# Patient Record
Sex: FEMALE | Race: WHITE | NOT HISPANIC OR LATINO | Employment: OTHER | URBAN - METROPOLITAN AREA
[De-identification: names, ages, dates, MRNs, and addresses within clinical notes are randomized per-mention and may not be internally consistent; named-entity substitution may affect disease eponyms.]

---

## 2019-12-27 RX ORDER — PROMETHAZINE HYDROCHLORIDE 25 MG/1
TABLET ORAL
Qty: 60 TABLET | Refills: 3 | Status: SHIPPED | OUTPATIENT
Start: 2019-12-27

## 2022-02-20 ENCOUNTER — HISTORICAL (OUTPATIENT)
Dept: ADMINISTRATIVE | Facility: HOSPITAL | Age: 72
End: 2022-02-20

## 2022-02-20 ENCOUNTER — HOSPITAL ENCOUNTER (OUTPATIENT)
Dept: MEDSURG UNIT | Facility: HOSPITAL | Age: 72
End: 2022-02-23
Attending: STUDENT IN AN ORGANIZED HEALTH CARE EDUCATION/TRAINING PROGRAM | Admitting: STUDENT IN AN ORGANIZED HEALTH CARE EDUCATION/TRAINING PROGRAM

## 2022-02-20 LAB
ABS NEUT (OLG): 17.01 (ref 2.1–9.2)
ALBUMIN SERPL-MCNC: 3.6 G/DL (ref 3.4–4.8)
ALBUMIN/GLOB SERPL: 1.4 {RATIO} (ref 1.1–2)
ALP SERPL-CCNC: 100 U/L (ref 40–150)
ALT SERPL-CCNC: 9 U/L (ref 0–55)
AMPHET UR QL SCN: NEGATIVE
APPEARANCE, UA: CLEAR
APTT PPP: 42.8 S (ref 23.3–37)
AST SERPL-CCNC: 15 U/L (ref 5–34)
BACTERIA SPEC CULT: NORMAL
BARBITURATE SCN PRESENT UR: NEGATIVE
BASOPHILS # BLD AUTO: 0.1 10*3/UL (ref 0–0.2)
BASOPHILS NFR BLD AUTO: 0 %
BENZODIAZ UR QL SCN: NEGATIVE
BILIRUB SERPL-MCNC: 0.7 MG/DL
BILIRUB UR QL STRIP: NEGATIVE
BILIRUBIN DIRECT+TOT PNL SERPL-MCNC: 0.3 (ref 0–0.5)
BILIRUBIN DIRECT+TOT PNL SERPL-MCNC: 0.4 (ref 0–0.8)
BNP BLD-MCNC: 106.4 PG/ML
BUN SERPL-MCNC: 8.6 MG/DL (ref 9.8–20.1)
CALCIUM SERPL-MCNC: 9.1 MG/DL (ref 8.7–10.5)
CANNABINOIDS UR QL SCN: NEGATIVE
CHLORIDE SERPL-SCNC: 100 MMOL/L (ref 98–107)
CO2 SERPL-SCNC: 27 MMOL/L (ref 23–31)
COCAINE UR QL SCN: NEGATIVE
COLOR UR: NORMAL
CREAT SERPL-MCNC: 0.92 MG/DL (ref 0.55–1.02)
EOSINOPHIL # BLD AUTO: 0 10*3/UL (ref 0–0.9)
EOSINOPHIL NFR BLD AUTO: 0 %
ERYTHROCYTE [DISTWIDTH] IN BLOOD BY AUTOMATED COUNT: 12.6 % (ref 11.5–14.5)
FENTANYL UR QL SCN: NEGATIVE
FLAG2 (OHS): 80
FLAG3 (OHS): 80
FLAGS (OHS): 70
GLOBULIN SER-MCNC: 2.6 G/DL (ref 2.4–3.5)
GLUCOSE (UA): NORMAL
GLUCOSE SERPL-MCNC: 143 MG/DL (ref 82–115)
HCT VFR BLD AUTO: 39.3 % (ref 35–46)
HEMOLYSIS INTERF INDEX SERPL-ACNC: 12
HEMOLYSIS INTERF INDEX SERPL-ACNC: 12
HGB BLD-MCNC: 13 G/DL (ref 12–16)
HGB UR QL STRIP: NEGATIVE
HYALINE CASTS #/AREA URNS LPF: NORMAL /[LPF]
ICTERIC INTERF INDEX SERPL-ACNC: 1
IMM GRANULOCYTES # BLD AUTO: 0.09 10*3/UL
IMM GRANULOCYTES NFR BLD AUTO: 0 %
IMM. NE 1 SUSPECT FLAG (OHS): 30
INR PPP: 1.17 (ref 0.9–1.2)
KETONES UR QL STRIP: NEGATIVE
LEUKOCYTE ESTERASE UR QL STRIP: NEGATIVE
LIPASE SERPL-CCNC: 33 U/L
LIPEMIC INTERF INDEX SERPL-ACNC: <0
LOW EVENT # SUSPECT FLAG (OHS): 70
LYMPHOCYTES # BLD AUTO: 2 10*3/UL (ref 0.6–4.6)
LYMPHOCYTES NFR BLD AUTO: 10 %
MAGNESIUM SERPL-MCNC: 2 MG/DL (ref 1.6–2.6)
MANUAL DIFF? (OHS): NO
MCH RBC QN AUTO: 32.3 PG (ref 26–34)
MCHC RBC AUTO-ENTMCNC: 33.1 G/DL (ref 31–37)
MCV RBC AUTO: 97.5 FL (ref 80–100)
MDMA UR QL SCN: NEGATIVE
MO BLASTS SUSPECT FLAG (OHS): 40
MONOCYTES # BLD AUTO: 1.9 10*3/UL (ref 0.1–1.3)
MONOCYTES NFR BLD AUTO: 9 %
NEUTROPHILS # BLD AUTO: 17.01 10*3/UL (ref 2.1–9.2)
NEUTROPHILS NFR BLD AUTO: 80 %
NITRITE UR QL STRIP: NEGATIVE
NRBC BLD AUTO-RTO: 0 % (ref 0–0.2)
OPIATES UR QL SCN: POSITIVE
PCP UR QL: NEGATIVE
PH UR STRIP.AUTO: 6.5 [PH] (ref 3–11)
PH UR STRIP: 6.5 [PH] (ref 4.5–8)
PHOSPHATE SERPL-MCNC: 2.5 MG/DL (ref 2.3–4.7)
PLATELET # BLD AUTO: 198 10*3/UL (ref 130–400)
PLATELET CLUMPS SUSPECT FLAG (OHS): 40
PMV BLD AUTO: 9.1 FL (ref 7.4–10.4)
POTASSIUM SERPL-SCNC: 3.6 MMOL/L (ref 3.5–5.1)
PROT SERPL-MCNC: 6.2 G/DL (ref 5.8–7.6)
PROT UR QL STRIP: NEGATIVE
PROTHROMBIN TIME: 14.7 S (ref 11.9–14.4)
RBC # BLD AUTO: 4.03 10*6/UL (ref 4–5.2)
RBC #/AREA URNS HPF: NORMAL /[HPF] (ref 0–5)
SODIUM SERPL-SCNC: 135 MMOL/L (ref 136–145)
SP GR UR STRIP: 1.01 (ref 1–1.03)
SQUAMOUS EPITHELIAL, UA: NORMAL
TROPONIN I SERPL-MCNC: 0.03 NG/ML (ref 0–0.04)
TSH SERPL-ACNC: 4.12 M[IU]/L (ref 0.35–4.94)
UROBILINOGEN UR STRIP-ACNC: NORMAL
WBC # SPEC AUTO: 21.2 10*3/UL (ref 4.5–11)
WBC #/AREA URNS HPF: NORMAL /[HPF] (ref 0–5)

## 2022-02-21 LAB
ABS NEUT (OLG): 13.82 (ref 2.1–9.2)
ALBUMIN SERPL-MCNC: 3.3 G/DL (ref 3.4–4.8)
ALBUMIN/GLOB SERPL: 1.2 {RATIO} (ref 1.1–2)
ALP SERPL-CCNC: 86 U/L (ref 40–150)
ALT SERPL-CCNC: 9 U/L (ref 0–55)
AST SERPL-CCNC: 13 U/L (ref 5–34)
BASOPHILS # BLD AUTO: 0.1 10*3/UL (ref 0–0.2)
BASOPHILS NFR BLD AUTO: 0 %
BILIRUB SERPL-MCNC: 0.5 MG/DL
BILIRUBIN DIRECT+TOT PNL SERPL-MCNC: 0.2 (ref 0–0.5)
BILIRUBIN DIRECT+TOT PNL SERPL-MCNC: 0.3 (ref 0–0.8)
BUN SERPL-MCNC: 5.8 MG/DL (ref 9.8–20.1)
CALCIUM SERPL-MCNC: 9 MG/DL (ref 8.7–10.5)
CHLORIDE SERPL-SCNC: 103 MMOL/L (ref 98–107)
CO2 SERPL-SCNC: 28 MMOL/L (ref 23–31)
CREAT SERPL-MCNC: 0.64 MG/DL (ref 0.55–1.02)
EOSINOPHIL # BLD AUTO: 0.1 10*3/UL (ref 0–0.9)
EOSINOPHIL NFR BLD AUTO: 0 %
ERYTHROCYTE [DISTWIDTH] IN BLOOD BY AUTOMATED COUNT: 12.8 % (ref 11.5–14.5)
FLAG2 (OHS): 80
FLAG3 (OHS): 80
FLAGS (OHS): 70
GLOBULIN SER-MCNC: 2.7 G/DL (ref 2.4–3.5)
GLUCOSE SERPL-MCNC: 125 MG/DL (ref 82–115)
HCT VFR BLD AUTO: 34.7 % (ref 35–46)
HEMOLYSIS INTERF INDEX SERPL-ACNC: 1
HGB BLD-MCNC: 11.5 G/DL (ref 12–16)
ICTERIC INTERF INDEX SERPL-ACNC: 0
IMM GRANULOCYTES # BLD AUTO: 0.09 10*3/UL
IMM GRANULOCYTES NFR BLD AUTO: 0 %
IMM. NE 1 SUSPECT FLAG (OHS): 20
LIPEMIC INTERF INDEX SERPL-ACNC: 1
LOW EVENT # SUSPECT FLAG (OHS): 70
LYMPHOCYTES # BLD AUTO: 2.2 10*3/UL (ref 0.6–4.6)
LYMPHOCYTES NFR BLD AUTO: 12 %
MANUAL DIFF? (OHS): NO
MCH RBC QN AUTO: 32.9 PG (ref 26–34)
MCHC RBC AUTO-ENTMCNC: 33.1 G/DL (ref 31–37)
MCV RBC AUTO: 99.1 FL (ref 80–100)
MO BLASTS SUSPECT FLAG (OHS): 40
MONOCYTES # BLD AUTO: 1.3 10*3/UL (ref 0.1–1.3)
MONOCYTES NFR BLD AUTO: 8 %
NEUTROPHILS # BLD AUTO: 13.82 10*3/UL (ref 2.1–9.2)
NEUTROPHILS NFR BLD AUTO: 79 %
NRBC BLD AUTO-RTO: 0 % (ref 0–0.2)
PLATELET # BLD AUTO: 190 10*3/UL (ref 130–400)
PLATELET CLUMPS SUSPECT FLAG (OHS): 10
PMV BLD AUTO: 9.4 FL (ref 7.4–10.4)
POTASSIUM SERPL-SCNC: 3.4 MMOL/L (ref 3.5–5.1)
PROT SERPL-MCNC: 6 G/DL (ref 5.8–7.6)
RBC # BLD AUTO: 3.5 10*6/UL (ref 4–5.2)
SODIUM SERPL-SCNC: 136 MMOL/L (ref 136–145)
WBC # SPEC AUTO: 17.6 10*3/UL (ref 4.5–11)

## 2022-02-22 LAB
ABS NEUT (OLG): 10.26 (ref 2.1–9.2)
ALBUMIN SERPL-MCNC: 2.9 G/DL (ref 3.4–4.8)
ALBUMIN/GLOB SERPL: 1 {RATIO} (ref 1.1–2)
ALP SERPL-CCNC: 92 U/L (ref 40–150)
ALT SERPL-CCNC: 9 U/L (ref 0–55)
AST SERPL-CCNC: 12 U/L (ref 5–34)
BASOPHILS # BLD AUTO: 0 10*3/UL (ref 0–0.2)
BASOPHILS NFR BLD AUTO: 0 %
BILIRUB SERPL-MCNC: 0.4 MG/DL
BILIRUBIN DIRECT+TOT PNL SERPL-MCNC: 0.2 (ref 0–0.5)
BILIRUBIN DIRECT+TOT PNL SERPL-MCNC: 0.2 (ref 0–0.8)
BUN SERPL-MCNC: 4.1 MG/DL (ref 9.8–20.1)
CALCIUM SERPL-MCNC: 8.6 MG/DL (ref 8.7–10.5)
CHLORIDE SERPL-SCNC: 103 MMOL/L (ref 98–107)
CO2 SERPL-SCNC: 26 MMOL/L (ref 23–31)
CREAT SERPL-MCNC: 0.57 MG/DL (ref 0.55–1.02)
EOSINOPHIL # BLD AUTO: 0.1 10*3/UL (ref 0–0.9)
EOSINOPHIL NFR BLD AUTO: 1 %
ERYTHROCYTE [DISTWIDTH] IN BLOOD BY AUTOMATED COUNT: 12.5 % (ref 11.5–14.5)
FLAG2 (OHS): 80
FLAG3 (OHS): 80
FLAGS (OHS): 70
GLOBULIN SER-MCNC: 2.8 G/DL (ref 2.4–3.5)
GLUCOSE SERPL-MCNC: 118 MG/DL (ref 82–115)
HCT VFR BLD AUTO: 34.5 % (ref 35–46)
HEMOLYSIS INTERF INDEX SERPL-ACNC: 5
HEMOLYSIS INTERF INDEX SERPL-ACNC: 9
HGB BLD-MCNC: 11.3 G/DL (ref 12–16)
ICTERIC INTERF INDEX SERPL-ACNC: 0
IMM GRANULOCYTES # BLD AUTO: 0.04 10*3/UL
IMM GRANULOCYTES NFR BLD AUTO: 0 %
IMM. NE 1 SUSPECT FLAG (OHS): 30
LIPEMIC INTERF INDEX SERPL-ACNC: 1
LOW EVENT # SUSPECT FLAG (OHS): 70
LYMPHOCYTES # BLD AUTO: 1.2 10*3/UL (ref 0.6–4.6)
LYMPHOCYTES NFR BLD AUTO: 10 %
MAGNESIUM SERPL-MCNC: 1.9 MG/DL (ref 1.6–2.6)
MANUAL DIFF? (OHS): NO
MCH RBC QN AUTO: 32.6 PG (ref 26–34)
MCHC RBC AUTO-ENTMCNC: 32.8 G/DL (ref 31–37)
MCV RBC AUTO: 99.4 FL (ref 80–100)
MO BLASTS SUSPECT FLAG (OHS): 40
MONOCYTES # BLD AUTO: 0.8 10*3/UL (ref 0.1–1.3)
MONOCYTES NFR BLD AUTO: 7 %
NEUTROPHILS # BLD AUTO: 10.26 10*3/UL (ref 2.1–9.2)
NEUTROPHILS NFR BLD AUTO: 82 %
NRBC BLD AUTO-RTO: 0 % (ref 0–0.2)
PHOSPHATE SERPL-MCNC: 3.6 MG/DL (ref 2.3–4.7)
PLATELET # BLD AUTO: 162 10*3/UL (ref 130–400)
PLATELET CLUMPS SUSPECT FLAG (OHS): 10
PMV BLD AUTO: 9.4 FL (ref 7.4–10.4)
POTASSIUM SERPL-SCNC: 3.5 MMOL/L (ref 3.5–5.1)
PROT SERPL-MCNC: 5.7 G/DL (ref 5.8–7.6)
RBC # BLD AUTO: 3.47 10*6/UL (ref 4–5.2)
SODIUM SERPL-SCNC: 139 MMOL/L (ref 136–145)
WBC # SPEC AUTO: 12.5 10*3/UL (ref 4.5–11)

## 2022-02-23 LAB
ABS NEUT (OLG): 5.49 (ref 2.1–9.2)
ALBUMIN SERPL-MCNC: 3 G/DL (ref 3.4–4.8)
ALBUMIN/GLOB SERPL: 1 {RATIO} (ref 1.1–2)
ALP SERPL-CCNC: 82 U/L (ref 40–150)
ALT SERPL-CCNC: 8 U/L (ref 0–55)
AST SERPL-CCNC: 13 U/L (ref 5–34)
BASOPHILS # BLD AUTO: 0 10*3/UL (ref 0–0.2)
BASOPHILS NFR BLD AUTO: 0 %
BILIRUB SERPL-MCNC: 0.4 MG/DL
BILIRUBIN DIRECT+TOT PNL SERPL-MCNC: 0.2 (ref 0–0.5)
BILIRUBIN DIRECT+TOT PNL SERPL-MCNC: 0.2 (ref 0–0.8)
BUN SERPL-MCNC: 3.9 MG/DL (ref 9.8–20.1)
CALCIUM SERPL-MCNC: 8.7 MG/DL (ref 8.7–10.5)
CHLORIDE SERPL-SCNC: 102 MMOL/L (ref 98–107)
CO2 SERPL-SCNC: 26 MMOL/L (ref 23–31)
CREAT SERPL-MCNC: 0.59 MG/DL (ref 0.55–1.02)
EOSINOPHIL # BLD AUTO: 0.1 10*3/UL (ref 0–0.9)
EOSINOPHIL NFR BLD AUTO: 1 %
ERYTHROCYTE [DISTWIDTH] IN BLOOD BY AUTOMATED COUNT: 12.3 % (ref 11.5–14.5)
FLAG2 (OHS): 80
FLAG3 (OHS): 80
FLAGS (OHS): 70
GLOBULIN SER-MCNC: 2.9 G/DL (ref 2.4–3.5)
GLUCOSE SERPL-MCNC: 113 MG/DL (ref 82–115)
HCT VFR BLD AUTO: 35.9 % (ref 35–46)
HEMOLYSIS INTERF INDEX SERPL-ACNC: 8
HEMOLYSIS INTERF INDEX SERPL-ACNC: 9
HGB BLD-MCNC: 11.8 G/DL (ref 12–16)
ICTERIC INTERF INDEX SERPL-ACNC: 0
IMM GRANULOCYTES # BLD AUTO: 0.03 10*3/UL
IMM GRANULOCYTES NFR BLD AUTO: 0 %
IMM. NE 1 SUSPECT FLAG (OHS): 10
IMM. NE 2 SUSPECT FLAG (OHS): 20
LIPEMIC INTERF INDEX SERPL-ACNC: <0
LOW EVENT # SUSPECT FLAG (OHS): 70
LYMPHOCYTES # BLD AUTO: 1.2 10*3/UL (ref 0.6–4.6)
LYMPHOCYTES NFR BLD AUTO: 16 %
MAGNESIUM SERPL-MCNC: 2.1 MG/DL (ref 1.6–2.6)
MANUAL DIFF? (OHS): NO
MCH RBC QN AUTO: 32.7 PG (ref 26–34)
MCHC RBC AUTO-ENTMCNC: 32.9 G/DL (ref 31–37)
MCV RBC AUTO: 99.4 FL (ref 80–100)
MO BLASTS SUSPECT FLAG (OHS): 40
MONOCYTES # BLD AUTO: 0.7 10*3/UL (ref 0.1–1.3)
MONOCYTES NFR BLD AUTO: 10 %
NEUTROPHILS # BLD AUTO: 5.49 10*3/UL (ref 2.1–9.2)
NEUTROPHILS NFR BLD AUTO: 72 %
NRBC BLD AUTO-RTO: 0 % (ref 0–0.2)
PHOSPHATE SERPL-MCNC: 3.5 MG/DL (ref 2.3–4.7)
PLATELET # BLD AUTO: 179 10*3/UL (ref 130–400)
PMV BLD AUTO: 9.4 FL (ref 7.4–10.4)
POTASSIUM SERPL-SCNC: 3 MMOL/L (ref 3.5–5.1)
PROT SERPL-MCNC: 5.9 G/DL (ref 5.8–7.6)
RBC # BLD AUTO: 3.61 10*6/UL (ref 4–5.2)
SODIUM SERPL-SCNC: 138 MMOL/L (ref 136–145)
WBC # SPEC AUTO: 7.6 10*3/UL (ref 4.5–11)

## 2022-05-14 NOTE — DISCHARGE SUMMARY
Admit and Discharge Dates  Admit Date: 02/20/2022  Discharge Date: 02/23/2022  Physicians  Attending Physician - Reji Gillis DO  Admitting Physician - Reji Gillis DO  Primary Care Physician - No PCP, No  Discharge Diagnosis  Abdominal pain?[id number]  Blood in stool?[id number]  Surgical Procedures  No procedures recorded for this visit.  Immunizations  No immunizations recorded for this visit.  Admission Information  ?71-year-old female past medical history for psoriatic arthritis presents to [facility] ED displayed from [facility], staying in [place] for the last 6 months in a motel. ?Patient stated that she has been constipated for couple weeks and decided to take MiraLAX, Metamucil and another laxative she missed in a glass of water on 2/18/2022. ?Later that night she began having bowel movements which initially started off as formed and then became liquid. ?The next day 2/19/2022 patient stated that she noticed blood in her stool but stated that it was covered from her urethra and the bed was covered in blood. ?Patient began feeling lightheaded on 2/20/2022 and EMS was called and when they found patient and she was hypotensive per ED physician. ?Patient endorses fevers (unconfirmed), fatigue and shortness of breath when she is around her family. ?Patient ambulates with a walker for the past 12 years due to her back pain. ?Patient has never had a colonoscopy. ?CT abdomen with contrast revealed distal colitis. Patient received a liter bolus of LR in ED  ?  Hospital Course  Patient was continued on her treatment of cipro and flagyll. Patients H/h was trended and was stable. GI was consulted, who have recommended that patient be referred outpatient for colonoscopy. Patient is medically and hemodynamically stable for discharge. Patient will fu w/ post wards and have a colonoscopy via referral to?GI at [facility]  Time Spent on discharge  >30 mins  Objective  Vitals &  Measurements  T:?37.4? ?C (Oral)? TMIN:?37.1? ?C (Oral)? TMAX:?37.4? ?C (Oral)? HR:?82(Peripheral)? RR:?19? BP:?148/83? SpO2:?92%?  Physical Exam  Eye: PERRLA, EOMI, clear conjunctiva, eyelids normal  HENT:?external ears?clear, oropharynx without erythema/exudate, oropharynx and nasal mucosal surfaces moist, no maxillary/frontal sinus tenderness to palpation  Neck: full range of motion, no thyromegaly or lymphadenopathy  Respiratory:?clear to auscultation bilaterally  Cardiovascular:?regular rate and rhythm without murmurs, gallops or rubs  Gastrointestinal:?soft, non-tender, non-distended with normal bowel sounds, without masses to palpation  Genitourinary: no CVA tenderness to palpation  Musculoskeletal:?decreased ROM of back  Integumentary: 2 oval-shaped bruises 1-2inches in diameter on LUQ  Neurologic: PERRL, EOMI, facial sensation and motor function intact, palate rises symmetrically, tongue protrusion midline, shoulder shrug intact, no signs of peripheral neurological deficit  Patient Discharge Condition  Medically and Hemodynamically stable  Discharge Disposition  Earlene Roy?is being discharged?home.  ?   Activity:?Return to normal activity.  Diet:?DASH Diet  ?  Medications:  Cipro Flagyl for 5 more days  Continue medications as previously prescribed  ?  ?   Follow Ups:  [facility] GI for colonoscopy  Post wards in 2 weeks  ?  ?   The above information was discussed with?the patient?in clear terms, who was able to repeat the instructions to me. All questions answered. ED precautions provided.?   Discharge Medication Reconciliation  Prescribed  Misc Prescription (Chay Walker)?See Instructions  atorvastatin (atorvastatin 80 mg oral tablet)?80 mg, Oral, Daily  benazepril (benazepril 20 mg oral tablet)?20 mg, Oral, Daily  ciprofloxacin (Cipro 500 mg oral tablet)?500 mg, Oral, BID  levothyroxine (levothyroxine 100 mcg (0.1 mg) oral tablet)?100 mcg, Oral, Daily  metoprolol (metoprolol tartrate 25 mg oral tab)?25 mg,  Oral, BID  metroNIDAZOLE (Flagyl 500 mg oral tablet)?500 mg, Oral, TID  polyethylene glycol 3350 (MiraLax oral powder for reconstitution)?17 gm, Oral, Daily  psyllium (psyllium 3.4 g/5.2 g oral powder for reconstitution)?1.7 gm, Oral, TID, PRN as needed for constipation  Continue  clonazePAM (clonazePAM 2 mg oral tablet)?2 mg, Oral, BID  clopidogrel (clopidogrel 75 mg oral tablet)?75 mg, Oral, Daily  gabapentin (Neurontin 300 mg oral capsule)?300 mg, Oral, TID  methocarbamol (methocarbamol 750 mg oral tablet)?1500 mg, Oral, TID  mirtazapine (mirtazapine 15 mg oral tablet)?15 mg, Oral, Once a day (at bedtime)  modafinil (modafinil 200 mg oral tablet)?200 mg, Oral, qAM  naproxen (naproxen 500 mg oral tablet)?500 mg, Oral, BID  venlafaxine (venlafaxine 150 mg oral capsule, extended release)?150 mg, Oral, Daily  Discontinue  acetaminophen-oxyCODONE (acetaminophen-oxycodone 325 mg-10 mg oral tablet)?1 tab(s), Oral, q6hr  azithromycin (azithromycin 250 mg oral tablet), Oral  cyclobenzaprine (cyclobenzaprine 10 mg oral tablet)?10 mg, Oral, TID  methylPREDNISolone (methylPREDNISolone 4 mg oral tab), Oral, As Directed  morphine (morphine 15 mg/8 to 12 hr oral tablet, extended release)?15 mg, Oral, BID  Education and Orders Provided  Constipation, Adult  Discharge - 02/23/22 11:37:00 CST, Home, discharge after meds to bed?  Follow up  [facility], within 1 month  ????Post wards  [employer]  No Qualifying Data

## 2022-05-14 NOTE — ED PROVIDER NOTES
Patient:   Earlene Roy            MRN: [id number]            FIN: [id number]               Age:   71 years     Sex:  Female     :  1950   Associated Diagnoses:   Acute colitis; Abdominal pain, acute   Author:   Juan Antonio Mcclelland DO      Basic Information   Time seen: Date & time 2022 08:50:00.   History source: Patient, EMS.   Arrival mode: Ambulance.   History limitation: None.   Additional information: Chief Complaint from Nursing Triage Note : Chief Complaint   2022 8:41 CST       Chief Complaint           Pt arrived via ems. Per ems, pt was constipated for 2 weeks. She took laxitive yesterday, had a bowel movement, and had karyn bright red blood in her stool. Also complaining of abdominal pain.  Pt hypotensive and tachycardic per ems  .   Provider/Visit info:   Time Seen:  Juan Antonio Mcclelland DO / 2022 08:38  .   History of Present Illness   71-year-old female presents to ED via EMS for rectal bleeding and abdominal discomfort.  Patient states she is never had this before.  States she did not have a bowel movement for greater than 1 week and took multiple laxatives.  States yesterday she had a large bowel movement.  Afterwards noticed intermittent lower abdominal cramping and discomfort.  States with this she had a movement of bowel movements and with some associated blood.  She states she felt weak so she called EMS.  on anticoagulation secondary to previous cardiac stents.  Compliant.  EMS states on arrival the patient appeared cool clammy and pale.  reported initially tachycardic in the 130s and hypotensive with systolic in the 80s.  They gave 500 of fluids and vitals grossly normalized besides mild residual tachycardia.  no other medications given, no other complaints at this time.      Review of Systems   Constitutional symptoms:  Weakness, fatigue, no fever, no chills.    Skin symptoms:  No rash,    Eye symptoms:  Vision unchanged.   ENMT symptoms:  No ear pain, no sore throat.     Respiratory symptoms:  No shortness of breath, no cough   Cardiovascular symptoms:  No chest pain, no syncope.    Gastrointestinal symptoms:  Abdominal pain, diarrhea, constipation, rectal bleeding, no nausea, no vomiting, no rectal pain.    Genitourinary symptoms:  No dysuria, no hematuria.    Musculoskeletal symptoms:  No back pain,    Neurologic symptoms:  No headache, no dizziness.    Psychiatric symptoms:               Additional review of systems information: All other systems reviewed and otherwise negative.      Health Status   Allergies:    Allergic Reactions (Selected)  Severity Not Documented  Demerol- Unknown.,    Allergies (1) Active Reaction  Demerol Unknown  .   Medications:  (Selected)   Prescriptions  Prescribed  Neurontin 300 mg oral capsule: 300 mg = 1 cap(s), Oral, TID, begin taking at night, # 90 cap(s), 0 Refill(s)  Documented Medications  Documented  acetaminophen-oxycodone 325 mg-10 mg oral tablet: 1 tab(s), Oral, q6hr  atorvastatin 80 mg oral tablet: 80 mg = 1 tab(s), Oral, Daily  azithromycin 250 mg oral tablet: Oral  benazepril 20 mg oral tablet: 20 mg = 1 tab(s), Oral, Daily  clonazePAM 2 mg oral tablet: 2 mg = 1 tab(s), Oral, BID  clopidogrel 75 mg oral tablet: 75 mg = 1 tab(s), Oral, Daily  cyclobenzaprine 10 mg oral tablet: 10 mg = 1 tab(s), Oral, TID  levothyroxine 100 mcg (0.1 mg) oral tablet: 100 mcg = 1 tab(s), Oral, Daily  methocarbamol 750 mg oral tablet: 1500 mg = 2 tab(s), Oral, TID  methylPREDNISolone 4 mg oral tab: Oral, As Directed  metoprolol tartrate 25 mg oral tab: 25 mg = 1 tab(s), Oral, BID  mirtazapine 15 mg oral tablet: 15 mg = 1 tab(s), Oral, Once a day (at bedtime)  modafinil 200 mg oral tablet: 200 mg = 1 tab(s), Oral, qAM  morphine 15 mg/8 to 12 hr oral tablet, extended release: 15 mg = 1 tab(s), Oral, BID  naproxen 500 mg oral tablet: 500 mg = 1 tab(s), Oral, BID  venlafaxine 150 mg oral capsule, extended release: 150 mg = 1 cap(s), Oral, Daily.      Past  Medical/ Family/ Social History   Medical history:    No active or resolved past medical history items have been selected or recorded..   Surgical history:    No active procedure history items have been selected or recorded..   Family history:    No family history items have been selected or recorded..   Social history:    Social & Psychosocial Habits    Alcohol  02/20/2022  Use: Never    Tobacco  11/29/2021  Use: Former smoker, quit more    Patient Wants Consult For Cessation Counseling N/A    02/20/2022  Use: Never (less than 100 in l    Patient Wants Consult For Cessation Counseling No    Abuse/Neglect  11/29/2021  SHX Any signs of abuse or neglect No    02/20/2022  SHX Any signs of abuse or neglect No    02/20/2022  SHX Any signs of abuse or neglect No  .   Problem list:    No qualifying data available  .      Physical Examination               Vital Signs   Vital Signs   2/20/2022 8:41 CST       Peripheral Pulse Rate     114 bpm  HI                             Respiratory Rate          14 br/min                             SpO2                      95 %                             Oxygen Therapy            Room air                             Systolic Blood Pressure   131 mmHg                             Diastolic Blood Pressure  75 mmHg  .      Vital Signs (last 24 hrs)_____  Last Charted___________  Heart Rate Peripheral   H 114bpm  (FEB 20 08:41)  Resp Rate         14 br/min  (FEB 20 08:41)  SBP      131 mmHg  (FEB 20 08:41)  DBP      75 mmHg  (FEB 20 08:41)  SpO2      95 %  (FEB 20 08:41)  Height      168 cm  (FEB 20 08:41)  .   Measurements   2/20/2022 8:41 CST       Weight Dosing             86 kg                             Weight Measured and Calculated in Lbs     189.60 lb                             Weight Estimated          86 kg                             Height/Length Dosing      168 cm                             Height/Length Measured    168 cm  .   Basic Oxygen Information   2/20/2022 8:41 CST        SpO2                      95 %                             Oxygen Therapy            Room air  .   General:  Alert, no acute distress, Patient appears in no acute distress.  Bedside vitals are stable on arrival besides initial mild sinus tachycardia.  Not hypotensive pale or clammy.  Physical exam is grossly unremarkable for acute except for moderate bilateral lower abdominal pain to palpation.  No rebound guarding rigidity. no other acute findings..    [place] coma scale:  Total score: Total score: 15.   Neurological:  Alert and oriented to person, place, time, and situation, normal motor observed, normal speech observed   Skin:  Warm, dry.    Head:  Normocephalic, atraumatic.    Neck:  Supple, trachea midline, no tenderness.    Eye:  Pupils are equal, round and reactive to light, extraocular movements are intact, normal conjunctiva, vision grossly normal.    Cardiovascular:  No murmur, Tachycardia.    Respiratory:  Lungs are clear to auscultation, respirations are non-labored, breath sounds are equal, Symmetrical chest wall expansion.    Chest wall:  No tenderness.   Musculoskeletal:  Normal ROM, normal strength, no swelling, no deformity.    Gastrointestinal:  Soft, Non distended, Tenderness: Moderate, right lower quadrant, left lower quadrant, Guarding: Negative, Rebound: Negative, Bowel sounds: Normal, Trauma: Negative, Signs: McBurney's negative, Arevalo's negative, Rovsing's negative.    Psychiatric:  Cooperative, appropriate mood & affect, normal judgment.       Medical Decision Making   Differential Diagnosis:  Abdominal pain, Appendicitis, bowel obstruction, bowel perforation, pancreatitis, irritable bowel syndrome, urinary tract infection, gastroesophageal reflux disease, gastroenteritis, peptic ulcer disease, gastritis, ischemic bowel, diverticulitis, constipation, incarcerated hernia.    Rationale:  Elderly female presents for worsening bilateral lower quadrant abdominal pain.  EMS were concerned  since on arrival she was tachycardic and hypotensive.  Blood pressure resolved with fluids however tachycardia remained.  She was tender to palpation without rebound guarding or rigidity.  Labs returned showing elevated white count of 21k.  Otherwise electrolytes and viral panel were unremarkable.  CT did show evidence of colitis w/o perf or abscess.  Secondary to these findings fluids, pain medicine and IV antibiotics were initiated.  Patient was admitted to internal medicine and care was formally transitioned.  pt informed of all findings and remained stable under my care..   Documents reviewed:  Emergency department nurses' notes, emergency department records, prior records.    Cardiac monitor:  Sinus Tachycardia.   Electrocardiogram:  No ectopy, The Rhythm is sinus tachycardia.  , The Axis is leftward.  , P wave and NV interval WNL, QT interval Prolonged 511 seconds, QRS interval WNL.    Results review:     No qualifying data available, All Results   2/20/2022 8:57 CST       WBC                       21.2 x10(3)/mcL  HI                             RBC                       4.03 x10(6)/mcL                             Hgb                       13.0 gm/dL                             Hct                       39.3 %                             Platelet                  198 x10(3)/mcL                             MCV                       97.5 fL                             MCH                       32.3 pg                             MCHC                      33.1 gm/dL                             RDW                       12.6 %                             MPV                       9.1 fL                             Abs Neut                  17.01 x10(3)/mcL  HI                             Neutro Auto               80 %  NA                             Lymph Auto                10 %  NA                             Mono Auto                 9 %  NA                             Eos Auto                  0 %  NA                              Abs Eos                   0.0 x10(3)/mcL                             Basophil Auto             0 %  NA                             Abs Neutro                17.01 x10(3)/mcL  HI                             Abs Lymph                 2.0 x10(3)/mcL                             Abs Mono                  1.9 x10(3)/mcL  HI                             Abs Baso                  0.1 x10(3)/mcL                             NRBC%                     0.0 %                             IG%                       0 %  NA                             IG#                       0.090  NA                             Sodium Lvl                135 mmol/L  LOW                             Potassium Lvl             3.6 mmol/L                             Chloride                  100 mmol/L                             CO2                       27 mmol/L                             Calcium Lvl               9.1 mg/dL                             Magnesium Lvl             2.00 mg/dL                             Glucose Lvl               143 mg/dL  HI                             BUN                       8.6 mg/dL  LOW                             Creatinine                0.92 mg/dL                             eGFR-AA                   78  LOW                             eGFR-TANAY                  64 mL/min/1.73 m2  LOW                             Bili Total                0.7 mg/dL                             Bili Direct               0.3 mg/dL                             Bili Indirect             0.40 mg/dL                             AST                       15 unit/L                             ALT                       9 unit/L                             Alk Phos                  100 unit/L                             Total Protein             6.2 gm/dL                             Albumin Lvl               3.6 gm/dL                             Globulin                  2.6 gm/dL                             A/G Ratio                  1.4 ratio                             Phosphorus                2.5 mg/dL                             BNP                       106.4 pg/mL  HI                             Lipase Lvl                33 U/L                             Troponin-I                0.033 ng/mL                             TSH                       4.1244 uIU/mL                             Influ A PCR               Negative                             Influ B PCR               Negative                             SARS-CoV-2 PCR            Not Detected                             ABORh Auto Intp           O POS                             ABSC Auto Intrp           Neg  .    Radiology results:  Rad Results (ST)  < 12 hrs     * Final Report *    Reason For Exam  Abdominal Pain    Radiology Report  CT ABDOMEN and PELVIS WITHOUT and WITH intravenous contrast  enhancement and 2-D reformations     HISTORY: Abdominal Pain    DLP 5440 MG Y CM      As per PQRS measures, all CT scans at this facility used dose  modulation, iterative reconstruction, and/or weight based dose  adjustment when appropriate to reduce radiation dose to as low as  reasonably achievable.     FINDINGS:   view remarkable for severe bilateral hip arthritic change     The distal colon is thick-walled with mucosal hyperemia and mild  surrounding stranding. No overt active extravasation of contrast  identified. No fluid collection or perforation identified. No  obstructive bowel findings. No pericecal inflammatory change.     Liver and spleen normal size. Gallbladder mildly distended. Pancreas  partially fatty atrophic. Adrenal glands and kidneys unremarkable.     Abdominal aorta mildly ectatic with moderate atherosclerotic change in  the aorta and branches. Urinary bladder moderately distended.  Severe lower lumbar spondylosis        IMPRESSION:  Distal colitis suspected uncomplicated.      Reexamination/ Reevaluation   Vital signs   Basic Oxygen Information    2/20/2022 8:41 CST       SpO2                      95 %                             Oxygen Therapy            Room air        Impression and Plan   Diagnosis   Acute colitis (HXG12-MC K52.9)   Abdominal pain, acute ([id number] R10.9)      Calls-Consults   -  IM.   Plan   Disposition: Admit to Inpatient Unit.    Counseled: Patient, Regarding diagnosis, Regarding diagnostic results, Regarding treatment plan, Regarding prescription, Patient indicated understanding of instructions.

## 2022-05-20 NOTE — HISTORICAL [facility]ERNER
This is a historical note converted from [employer]. Formatting and pictures may have been removed.  Please reference [employer] for original formatting and attached multimedia. Chief Complaint  Pt arrived via ems. Per ems, pt was constipated for 2 weeks. She took laxitive yesterday, had a bowel movement, and had karyn bright red blood in her stool. Also complaining of abdominal pain. ?Pt hypotensive and tachycardic per ems  Reason for Consultation  BRBPR  History of Present Illness  72 y/o WF, pMHx HTN, HLD, CAD, MI, hypothyroidism, psoriatic arthritis,?chronic pain presented to the ED on 2/20/2022 w/ abd pain and BRBPR. Patient reported constipation x 2 weeks. She took MiraLAX,?Metamucil,?and bisacodyl on 2/18/2022 and subsequently had a large bright red bowel movement. GI has been consulted for BRBPR  ?  Today, patient is incoherent. She is only oriented to name and answers all other questions inappropriately.  Review of Systems  Unable to obtain 2/2 AMS  Physical Exam  Vitals & Measurements  T:?37.0? ?C (Oral)? TMIN:?36.7? ?C (Oral)? TMAX:?37.4? ?C (Oral)? HR:?98(Monitored)? HR:?96(Peripheral)? RR:?16? BP:?122/77? SpO2:?95%? WT:?86?kg? BMI:?30.47?  General:?well-developed well-nourished in no acute distress  Eye: PERRLA, EOMI, clear conjunctiva  HENT:? oropharynx without erythema/exudate, oropharynx and nasal mucosal surfaces moist  Neck:? no thyromegaly or lymphadenopathy, trachea midline  Respiratory:?symmetrical chest expansion and respiratory effort, clear to auscultation bilaterally  Cardiovascular:?regular rate and rhythm without murmurs, gallops or rubs  Gastrointestinal:?soft, non-tender, non-distended, normoactive bowel sounds?without masses to palpation  Integumentary: no rashes or skin lesions present  Neurologic: cranial nerves intact, lethargic, arousable to verbal stimuli, oriented only to self  Psych: unable to assess  ?  Labs Last 24 Hours?  ?Chemistry? Hematology/Coagulation?   Sodium Lvl: 136 mmol/L  (02/21/22 04:30:00) WBC:?17.6 x10(3)/mcL?High (02/21/22 04:30:00)   Potassium Lvl:?3.4 mmol/L?Low (02/21/22 04:30:00) RBC:?3.5 x10(6)/mcL?Low (02/21/22 04:30:00)   Chloride: 103 mmol/L (02/21/22 04:30:00) Hgb:?11.5 gm/dL?Low (02/21/22 04:30:00)   CO2: 28 mmol/L (02/21/22 04:30:00) Hct:?34.7 %?Low (02/21/22 04:30:00)   Calcium Lvl: 9 mg/dL (02/21/22 04:30:00) Platelet: 190 x10(3)/mcL (02/21/22 04:30:00)   Glucose Lvl:?125 mg/dL?High (02/21/22 04:30:00) MCV: 99.1 fL (02/21/22 04:30:00)   BUN:?5.8 mg/dL?Low (02/21/22 04:30:00) MCH: 32.9 pg (02/21/22 04:30:00)   Creatinine: 0.64 mg/dL (02/21/22 04:30:00) MCHC: 33.1 gm/dL (02/21/22 04:30:00)   Est Creat Clearance Ser: 76.99 mL/min (02/21/22 05:21:21) RDW: 12.8 % (02/21/22 04:30:00)   eGFR-AA: >105 (02/21/22 04:30:00) MPV: 9.4 fL (02/21/22 04:30:00)   eGFR-TANYA: 98 mL/min/1.73 m2 (02/21/22 04:30:00) Abs Neut:?13.82 x10(3)/mcL?High (02/21/22 04:30:00)   Bili Total: 0.5 mg/dL (02/21/22 04:30:00) Neutro Auto: 79 % (02/21/22 04:30:00)   Bili Direct: 0.2 mg/dL (02/21/22 04:30:00) Lymph Auto: 12 % (02/21/22 04:30:00)   Bili Indirect: 0.3 mg/dL (02/21/22 04:30:00) Mono Auto: 8 % (02/21/22 04:30:00)   AST: 13 unit/L (02/21/22 04:30:00) Eos Auto: 0 % (02/21/22 04:30:00)   ALT: 9 unit/L (02/21/22 04:30:00) Abs Eos: 0.1 x10(3)/mcL (02/21/22 04:30:00)   Alk Phos: 86 unit/L (02/21/22 04:30:00) Basophil Auto: 0 % (02/21/22 04:30:00)   Total Protein: 6 gm/dL (02/21/22 04:30:00) Abs Neutro:?13.82 x10(3)/mcL?High (02/21/22 04:30:00)   Albumin Lvl:?3.3 gm/dL?Low (02/21/22 04:30:00) Abs Lymph: 2.2 x10(3)/mcL (02/21/22 04:30:00)   Globulin: 2.7 gm/dL (02/21/22 04:30:00) Abs Mono: 1.3 x10(3)/mcL (02/21/22 04:30:00)   A/G Ratio: 1.2 ratio (02/21/22 04:30:00) Abs Baso: 0.1 x10(3)/mcL (02/21/22 04:30:00)    NRBC%: 0.0 (02/21/22 04:30:00)    IG%: 0 % (02/21/22 04:30:00)    IG#: 0.09 (02/21/22 04:30:00)    PT:?14.7 second(s)?High (02/20/22 13:23:00)    INR: 1.17 (02/20/22 13:23:00)    PTT:?42.8  second(s)?High (02/20/22 13:23:00)   ?  Accession:?[id number]  Order:?CT Abdomen and Pelvis W W/O Contrast  Report Dt/Tm:?02/20/2022 11:19  Report:?  CT ABDOMEN and PELVIS WITHOUT and WITH intravenous contrast  enhancement and 2-D reformations  ?  HISTORY: Abdominal Pain ?  DLP 5440 MG Y CM?  ?  As per PQRS measures, all CT scans at this facility used dose  modulation, iterative reconstruction, and/or weight based dose  adjustment when appropriate to reduce radiation dose to as low as  reasonably achievable.  ?  FINDINGS:   view remarkable for severe bilateral hip arthritic change  ?  The distal colon is thick-walled with mucosal hyperemia and mild  surrounding stranding. No overt active extravasation of contrast  identified. No fluid collection or perforation identified. No  obstructive bowel findings. No pericecal inflammatory change.  ?  Liver and spleen normal size. Gallbladder mildly distended. Pancreas  partially fatty atrophic. Adrenal glands and kidneys unremarkable.  ?  Abdominal aorta mildly ectatic with moderate atherosclerotic change in  the aorta and branches. Urinary bladder moderately distended.  Severe lower lumbar spondylosis  ?  ?  IMPRESSION:  Distal colitis suspected uncomplicated?  Assessment/Plan  1. ?Hematochezia  ?-Hgb stable  ?-Will reevaluate tomorrow  ?-If Hgb remains stable, will pursue?outpatient colonoscopy   Problem List/Past Medical History  Ongoing  Obesity  Historical  No qualifying data  Medications  Inpatient  atorvastatin 20 mg oral tablet, 80 mg= 4 tab(s), Oral, Daily  benazepril 20 mg oral tablet, 20 mg, Oral, Daily  Cipro, 400 mg= 200 mL, IV Piggyback, q12hr  clonazePAM, 2 mg= 2 tab(s), Oral, BID  clopidogrel 75 mg oral tablet, 75 mg= 1 tab(s), Oral, Daily  Effexor  mg oral capsule, extended release, 150 mg= 1 cap(s), Oral, Daily  Flagyl 500 mg / 100 ml premix, 500 mg= 100 mL, IV Piggyback, q8hr  KCL 10 mEq Uizel - Peripheral Line, 10 mEq= 50 mL, IV Piggyback,  q1hr  Lactated Ringers 1000ml 1,000 mL, 1000 mL, IV  levothyroxine 100 mcg (0.1 mg) oral tablet, 100 mcg= 1 tab(s), Oral, Daily  Lovenox, 40 mg= 0.4 mL, Subcutaneous, Daily  metoprolol tartrate 25 mg oral tab, 25 mg= 1 tab(s), Oral, BID  mirtazapine, 15 mg= 0.5 tab(s), Oral, Once a day (at bedtime)  Protonix, 40 mg= 1 EA, IV Slow, Daily  Home  acetaminophen-oxycodone 325 mg-10 mg oral tablet, 1 tab(s), Oral, q6hr  atorvastatin 80 mg oral tablet, 80 mg= 1 tab(s), Oral, Daily  azithromycin 250 mg oral tablet, Oral,? ?Not taking  benazepril 20 mg oral tablet, 20 mg= 1 tab(s), Oral, Daily  clonazePAM 2 mg oral tablet, 2 mg= 1 tab(s), Oral, BID  clopidogrel 75 mg oral tablet, 75 mg= 1 tab(s), Oral, Daily  cyclobenzaprine 10 mg oral tablet, 10 mg= 1 tab(s), Oral, TID  levothyroxine 100 mcg (0.1 mg) oral tablet, 100 mcg= 1 tab(s), Oral, Daily  methocarbamol 750 mg oral tablet, 1500 mg= 2 tab(s), Oral, TID  methylPREDNISolone 4 mg oral tab, Oral, As Directed,? ?Not taking  metoprolol tartrate 25 mg oral tab, 25 mg= 1 tab(s), Oral, BID  mirtazapine 15 mg oral tablet, 15 mg= 1 tab(s), Oral, Once a day (at bedtime)  modafinil 200 mg oral tablet, 200 mg= 1 tab(s), Oral, qAM  morphine 15 mg/8 to 12 hr oral tablet, extended release, 15 mg= 1 tab(s), Oral, BID  naproxen 500 mg oral tablet, 500 mg= 1 tab(s), Oral, BID  Neurontin 300 mg oral capsule, 300 mg= 1 cap(s), Oral, TID,? ?Not taking  venlafaxine 150 mg oral capsule, extended release, 150 mg= 1 cap(s), Oral, Daily  Allergies  Demerol?(Unknown)  Social History  Abuse/Neglect  No, 02/20/2022  No, 02/20/2022  No, 11/29/2021  Alcohol  Never, 02/20/2022  Tobacco  Never (less than 100 in lifetime), No, 02/20/2022  Former smoker, quit more than 30 days ago, N/A, 11/29/2021      Patient seen and examined in conjunction with CHETNA Chavira, during rounds on 02/21. ?I actively participated in all aspects of todays patient encounter along with the NP, and agree with the  assessment and plan as outlined above. ?  ?   Bright red blood per rectum in the setting of constipation.? CT with some evidence of?wall thickening concerning for colitis. ?She is on ciprofloxacin and metronidazole. ?There is no been no further?evidence of bleeding since admission. ?Her hemoglobin is stable.? She likely can be followed as an outpatient for colonoscopy. ?We will continue to monitor.